# Patient Record
Sex: FEMALE | Race: WHITE | NOT HISPANIC OR LATINO | Employment: PART TIME | URBAN - METROPOLITAN AREA
[De-identification: names, ages, dates, MRNs, and addresses within clinical notes are randomized per-mention and may not be internally consistent; named-entity substitution may affect disease eponyms.]

---

## 2017-12-22 ENCOUNTER — GENERIC CONVERSION - ENCOUNTER (OUTPATIENT)
Dept: OTHER | Facility: OTHER | Age: 37
End: 2017-12-22

## 2017-12-29 ENCOUNTER — ALLSCRIPTS OFFICE VISIT (OUTPATIENT)
Dept: OTHER | Facility: OTHER | Age: 37
End: 2017-12-29

## 2017-12-30 NOTE — PROGRESS NOTES
Assessment   1  Stress incontinence, female (625 6) (N39 3)    Plan   Patient to perform Kegel's exercises according to the instructions handed out to her  Also to schedule annual visit  Discussion/Summary   Discussion Summary:    I reassured Ross Kong that on exam, she has very minimal bladder and rectal protrusion  She has also very little deflection of the urethra  Overall this is certainly not a surgical issue and nothing that Kegel's exercises if performed diligently should not be able to correct  will also return to have an annual visit  She was encouraged to call with any questions or concerns  Chief Complaint   Chief Complaint Free Text Note Form: PT THINKS SHE HAS POSSIBLE CYSTOCELE OR PROLAPSE      History of Present Illness   HPI: Ross Kong is seen today as a new patient  She had 2 normal vaginal deliveries at term of a 7 an 8 lb infant respectively  Since delivering the 2nd infant, the patient was suspicious of possible cystocele and rectocele  She denies any bowel issues  Bladder why she does have some stress incontinence especially while exercising  She denies any residual type urinary effects after she has voided  She is not sure whether not she does see a cystocele protruding  She has had no other issues and recently had a Pap smear 2 years ago that was within normal limits  Review of Systems   Focused-Female:      Constitutional: No fever, no chills, feels well, no tiredness, no recent weight gain or loss  ENT: no ear ache, no loss of hearing, no nosebleeds or nasal discharge, no sore throat or hoarseness  Cardiovascular: no complaints of slow or fast heart rate, no chest pain, no palpitations, no leg claudication or lower extremity edema  Respiratory: no complaints of shortness of breath, no wheezing, no dyspnea on exertion, no orthopnea or PND  Breasts: no complaints of breast pain, breast lump or nipple discharge        Gastrointestinal: no complaints of abdominal pain, no constipation, no nausea or diarrhea, no vomiting, no bloody stools  Genitourinary: as noted in HPI  Musculoskeletal: no complaints of arthralgia, no myalgia, no joint swelling or stiffness, no limb pain or swelling  Integumentary: no complaints of skin rash or lesion, no itching or dry skin, no skin wounds  Neurological: no complaints of headache, no confusion, no numbness or tingling, no dizziness or fainting  Active Problems   1  Asthma (493 90) (J45 909)   2  Celiac disease (579 0) (K90 0)    Past Medical History   1  Denied: History of self breast exam    Surgical History   1  History of Eye Surgery   2  History of Nasal Endoscopy Polypectomy   3  History of Oral Surgery Tooth Extraction    Family History   Family History    1  Family history of cardiac disorder (V17 49) (Z82 49)   2  Family history of chronic obstructive pulmonary disease (V17 6) (Z82 5)   3  Family history of depression (V17 0) (Z81 8)   4  Family history of essential hypertension (V17 49) (Z82 49)   5  Family history of malignant neoplasm (V16 9) (Z80 9)   6  Family history of malignant neoplasm of breast (V16 3) (Z80 3)    Social History    · Always uses seat belt   · Caffeine use (V49 89) (F15 90)   · Condom use   · Never a smoker   · Social alcohol use (Z78 9)   · Two children    Current Meds    1  Albuterol Sulfate NEBU; Therapy: (Recorded:73Loq0385) to Recorded   2  Claritin TABS; Therapy: (Recorded:51Iaf9220) to Recorded   3  Flonase 50 MCG/ACT SUSP; Therapy: (Veronica Vargas) to Recorded   4  Nystatin-Triamcinolone 413674-4 1 UNIT/GM-% External Cream;     Therapy: (Recorded:23Oil9949) to Recorded    Allergies   1  No Known Drug Allergies  2  Animal dander - Cats   3  Animal dander - Dogs   4  Dust   5  Pollen   6  Seafood   7   Shellfish    Vitals   Vital Signs    Recorded: 59EQY5327 70:61YS   Systolic 011, LUE, Sitting   Diastolic 78, LUE, Sitting   Height 5 ft 5 in   Weight 151 lb BMI Calculated 25 13   BSA Calculated 1 76   LMP [id number]     Physical Exam        Constitutional      General appearance: No acute distress, well appearing and well nourished  Genitourinary      External genitalia: Normal and no lesions appreciated  Vagina: Normal, no lesions or dryness appreciated  -- Minimal cystocele and rectocele noted with very little U-V angle deflection  Urethra: Normal        Urethral meatus: Normal        Bladder: Normal, soft, non-tender and no prolapse or masses appreciated  Cervix: Normal, no palpable masses  Uterus: Normal, non-tender, not enlarged, and no palpable masses  Adnexa/parametria: Normal, non-tender and no fullness or masses appreciated  Psychiatric      Orientation to person, place, and time: Normal        Mood and affect: Normal        Signatures    Electronically signed by :  Yaz Jean MD; Dec 29 2017 12:52PM EST                       (Author)

## 2018-01-23 VITALS
BODY MASS INDEX: 25.16 KG/M2 | SYSTOLIC BLOOD PRESSURE: 124 MMHG | HEIGHT: 65 IN | DIASTOLIC BLOOD PRESSURE: 78 MMHG | WEIGHT: 151 LBS

## 2018-01-23 NOTE — MISCELLANEOUS
Message   Date: 22 Dec 2017 10:11 AM EST, Recorded By: Massimo Macias For: Matty Carroll, Self   Phone: [phone] (Home)   Reason: Medical Complaint   pt had 2 children and she feels a bulge and feels that maybe she has a prolapse    pt will schedule appt   NP        Signatures   Electronically signed by : Oswaldo Leslie, ; Dec 22 2017 10:12AM EST                       (Author)

## 2018-07-11 ENCOUNTER — TRANSCRIBE ORDERS (OUTPATIENT)
Dept: ADMINISTRATIVE | Facility: HOSPITAL | Age: 38
End: 2018-07-11

## 2018-07-11 ENCOUNTER — HOSPITAL ENCOUNTER (OUTPATIENT)
Dept: NON INVASIVE DIAGNOSTICS | Facility: CLINIC | Age: 38
Discharge: HOME | End: 2018-07-11
Payer: COMMERCIAL

## 2018-07-11 DIAGNOSIS — R00.0 TACHYCARDIA: ICD-10-CM

## 2018-07-11 DIAGNOSIS — M79.605 LEFT LEG PAIN: ICD-10-CM

## 2018-07-11 DIAGNOSIS — R60.0 LOCALIZED EDEMA: ICD-10-CM

## 2018-07-11 DIAGNOSIS — I83.892 VARICOSE VEINS OF LEG WITH SWELLING, LEFT: ICD-10-CM

## 2018-07-11 DIAGNOSIS — M79.605 LEFT LEG PAIN: Primary | ICD-10-CM

## 2018-07-11 DIAGNOSIS — R00.0 TACHYCARDIA, UNSPECIFIED: Primary | ICD-10-CM

## 2018-07-11 DIAGNOSIS — Z82.49 FAMILY HISTORY OF ISCHEMIC HEART DISEASE: ICD-10-CM

## 2018-07-11 PROCEDURE — 93971 EXTREMITY STUDY: CPT | Performed by: INTERNAL MEDICINE

## 2018-07-11 PROCEDURE — 93971 EXTREMITY STUDY: CPT

## 2018-07-27 ENCOUNTER — HOSPITAL ENCOUNTER (OUTPATIENT)
Dept: NON INVASIVE DIAGNOSTICS | Facility: CLINIC | Age: 38
Discharge: HOME | End: 2018-07-27
Payer: COMMERCIAL

## 2018-07-27 DIAGNOSIS — Z82.49 FAMILY HISTORY OF ISCHEMIC HEART DISEASE: ICD-10-CM

## 2018-07-27 DIAGNOSIS — R00.0 TACHYCARDIA, UNSPECIFIED: ICD-10-CM

## 2018-07-27 PROCEDURE — 93306 TTE W/DOPPLER COMPLETE: CPT | Performed by: INTERNAL MEDICINE

## 2018-07-27 PROCEDURE — 93225 XTRNL ECG REC<48 HRS REC: CPT

## 2018-07-27 PROCEDURE — 93306 TTE W/DOPPLER COMPLETE: CPT

## 2018-07-27 PROCEDURE — 93226 XTRNL ECG REC<48 HR SCAN A/R: CPT

## 2018-08-10 PROCEDURE — 93227 XTRNL ECG REC<48 HR R&I: CPT | Performed by: INTERNAL MEDICINE

## 2021-05-03 ENCOUNTER — TRANSCRIBE ORDERS (OUTPATIENT)
Dept: ADMINISTRATIVE | Facility: HOSPITAL | Age: 41
End: 2021-05-03

## 2021-05-03 DIAGNOSIS — G62.9 POLYNEUROPATHY, UNSPECIFIED: ICD-10-CM

## 2021-05-03 DIAGNOSIS — M46.92 CERVICAL SPONDYLITIS (HCC): Primary | ICD-10-CM

## 2022-10-11 ENCOUNTER — TELEPHONE (OUTPATIENT)
Dept: PSYCHIATRY | Facility: CLINIC | Age: 42
End: 2022-10-11

## 2023-02-09 NOTE — TELEPHONE ENCOUNTER
called pt in regards to child waitlist for PF  LVM for pt to contact intake at [facility]  DISCHARGE

## 2023-04-26 ENCOUNTER — TELEPHONE (OUTPATIENT)
Dept: PSYCHIATRY | Facility: CLINIC | Age: 43
End: 2023-04-26

## 2023-11-14 ENCOUNTER — OFFICE VISIT (OUTPATIENT)
Dept: OBGYN CLINIC | Facility: CLINIC | Age: 43
End: 2023-11-14
Payer: COMMERCIAL

## 2023-11-14 VITALS
DIASTOLIC BLOOD PRESSURE: 60 MMHG | HEIGHT: 64 IN | BODY MASS INDEX: 29.06 KG/M2 | WEIGHT: 170.2 LBS | SYSTOLIC BLOOD PRESSURE: 108 MMHG

## 2023-11-14 DIAGNOSIS — Z12.31 ENCOUNTER FOR SCREENING MAMMOGRAM FOR MALIGNANT NEOPLASM OF BREAST: ICD-10-CM

## 2023-11-14 DIAGNOSIS — Z01.419 ENCOUNTER FOR ANNUAL ROUTINE GYNECOLOGICAL EXAMINATION: Primary | ICD-10-CM

## 2023-11-14 DIAGNOSIS — N92.0 MENORRHAGIA WITH REGULAR CYCLE: ICD-10-CM

## 2023-11-14 DIAGNOSIS — Z80.3 FAMILY HISTORY OF BREAST CANCER: ICD-10-CM

## 2023-11-14 DIAGNOSIS — Z12.4 CERVICAL CANCER SCREENING: ICD-10-CM

## 2023-11-14 PROCEDURE — 99386 PREV VISIT NEW AGE 40-64: CPT | Performed by: OBSTETRICS & GYNECOLOGY

## 2023-11-14 RX ORDER — EPINEPHRINE 0.3 MG/.3ML
INJECTION SUBCUTANEOUS
COMMUNITY
Start: 2023-08-16 | End: 2023-11-14

## 2023-11-14 RX ORDER — ALBUTEROL SULFATE 90 UG/1
2 AEROSOL, METERED RESPIRATORY (INHALATION)
COMMUNITY
Start: 2023-09-08

## 2023-11-14 RX ORDER — CETIRIZINE HYDROCHLORIDE 10 MG/1
10 TABLET, CHEWABLE ORAL DAILY
COMMUNITY

## 2023-11-14 RX ORDER — CLOBETASOL PROPIONATE 0.5 MG/G
OINTMENT TOPICAL AS NEEDED
COMMUNITY
Start: 2023-10-30

## 2023-11-14 RX ORDER — MULTIVITAMIN
1 TABLET ORAL DAILY
COMMUNITY

## 2023-11-14 NOTE — PATIENT INSTRUCTIONS
- Maintain healthy weight with BMI ideally between 18-25.    - Eat a healthy diet, including multiple servings of vegetables and fruits, as well as lean protein sources. - Get at least 150 minutes of moderate aerobic activity or 75 minutes of vigorous aerobic activity a week, or a combination of moderate and vigorous activity. Greater amounts of exercise will provide an even greater health benefit. - Ensure diet provides 1200mg of Calcium daily (divided) and 800IU of vitamin D, or take supplements to meet this. - Safe sex practices recommended. - Resources - information on birth control and sexually transmitted infections - www.bedsider. org            - information on sexually transmitted infections - www.cdc.gov/std/     Lysteda for heavy periods - prescription  Ibuprofen 600mg every 6 hours - over the counter

## 2023-11-14 NOTE — LETTER
November 19, 2023     Lennox Paradise, DO L[facility] Smart    Patient: Staci Asif   YOB: 1980   Date of Visit: 11/14/2023       Dear Dr. Shine Bring: Thank you for referring Yamilet Turner to me for evaluation. Below are my notes for this consultation. If you have questions, please do not hesitate to call me. I look forward to following your patient along with you. Sincerely,        Karin Herrmann MD        CC: No Recipients    Karin Herrmann MD  11/19/2023  5:03 PM  Sign when Signing Visit  Annual Wellness Visit  [address], [address], [facility] [address] [facility],[address]    ASSESSMENT/PLAN: Staci Asif is a 41 y.o. No obstetric history on file. who presents for annual gynecologic exam.    Encounter for routine gynecologic examination  - Routine well woman exam completed today. - Cervical Cancer Screening: Current ASCCP Guidelines reviewed. Last Pap: 2029 per pt. Next Pap Due: today, routine.  - STI screening offered including HIV testing: offered, pt declined  - Contraceptive counseling discussed. Current contraception: condoms,   - Breast Cancer Screening: Last Mammogram normal per pt - due now. - The following were reviewed in today's visit: mammography screening ordered, family planning choices, exercise, and healthy diet    Additional problems addressed during this visit:  1. Encounter for annual routine gynecological examination    2. Encounter for screening mammogram for malignant neoplasm of breast  -     Mammo screening bilateral w 3d & cad; Future    3. Family history of breast cancer  -     Mammo screening bilateral w 3d & cad; Future    4. Cervical cancer screening  -     Thinprep Tis Pap and HPV mRNA E6/E7 Reflex HPV 16,18/45    5. Menorrhagia with regular cycle  Assessment & Plan:  Regular but short cycle. 2023 cbc and tsh normal with PCP. Offered u/s and discussed Tranexamic Acid trial, pt declined both.           Next visit: 1 year WEllness      CC:  Annual Gynecologic Examination    HPI: Hugh Velasco is a 41 y.o. No obstetric history on file. who presents for annual gynecologic examination. Brent Shelley presents as new patient today for [employer]. Periods used to be 4-5day, moderate flow. Now in last year first day bleeding through pads and tampons. Next 3 days moderate to light. Periods about monthly - about 26 days. Hgb 11g/dl and TSH normal with PCP recently. Condoms for contraception. Was on OCPs before - stopped a couple years ago. No new partners. No issues. Last pap 2019 normal.  No abnl or positive HPV. Gardasil not done. Fibrocystic breast and often f/u mammograms. Screening due now. Celiac disease and seeing rheum for possible rheum arthritis. Gyn History  Patient's last menstrual period was 10/24/2023 (approximate). Last Pap: 2019 normal per pt    She  reports being sexually active and has had partner(s) who are male. She reports using the following method of birth control/protection: Condom. OB History  No obstetric history on file. Past Medical History:  No date: Asthma  No date: Celiac disease  No date: Eczema      Comment:  topical treament  2019: History of Papanicolaou smear of cervix      Comment:  negative per patient  07/2023: History of screening mammography      Comment:  Fibrocystic breasts with calcifications. Mammo every 6                months.   No date: Seasonal allergies     Past Surgical History:  2000: WISDOM TOOTH EXTRACTION     Family History   Problem Relation Age of Onset   • Skin cancer Father         father with genetic testing and no mutations known   • Colon polyps Father    • Pancreatic cancer Paternal Grandmother 61   • Breast cancer Paternal Aunt 46   • Lymphoma Paternal Uncle    • Colon cancer Neg Hx         Social History     Tobacco Use   • Smoking status: Never   • Smokeless tobacco: Never   Vaping Use   • Vaping Use: Never used   Substance Use Topics   • Alcohol use: Yes     Comment: social   • Drug use: Never          Current Outpatient Medications:   •  albuterol (PROVENTIL HFA,VENTOLIN HFA) 90 mcg/act inhaler, Inhale 2 puffs every 4 to 6 hours as needed, Disp: , Rfl:   •  cetirizine (ZyrTEC) 10 MG chewable tablet, Chew 10 mg daily, Disp: , Rfl:   •  clobetasol (TEMOVATE) 0.05 % ointment, if needed, Disp: , Rfl:   •  Multiple Vitamin (multivitamin) tablet, Take 1 tablet by mouth daily, Disp: , Rfl:     She is allergic to fish allergy - food allergy, cat hair extract, dust mite extract, pollen extract, and shellfish allergy - food allergy. .    ROS negative except as noted in HPI    Objective:  /60   Ht 5' 4.25" (1.632 m)   Wt 77.2 kg (170 lb 3.2 oz)   LMP 10/24/2023 (Approximate)   Breastfeeding No   BMI 28.99 kg/m²      Physical Exam  Constitutional:       Appearance: Normal appearance. Chest:   Breasts:     Right: Normal. No mass or tenderness. Left: Normal. No mass or tenderness. Abdominal:      Palpations: Abdomen is soft. Tenderness: There is no abdominal tenderness. Genitourinary:     General: Normal vulva. Vagina: No bleeding or lesions. Cervix: Normal.      Uterus: Normal. Not tender. Adnexa:         Right: No mass or tenderness. Left: No mass or tenderness. Rectum: No external hemorrhoid. Musculoskeletal:         General: Normal range of motion. Lymphadenopathy:      Upper Body:      Right upper body: No axillary adenopathy. Left upper body: No axillary adenopathy. Neurological:      Mental Status: She is alert and oriented to person, place, and time.    Psychiatric:         Mood and Affect: Mood normal.         Behavior: Behavior normal.

## 2023-11-14 NOTE — PROGRESS NOTES
Annual Wellness Visit  [address]  [address] [facility], [address], [place], [place] [address]    ASSESSMENT/PLAN: Arlette Snyder is a 41 y.o. No obstetric history on file. who presents for annual gynecologic exam.    Encounter for routine gynecologic examination  - Routine well woman exam completed today. - Cervical Cancer Screening: Current ASCCP Guidelines reviewed. Last Pap: 2029 per pt. Next Pap Due: today, routine.  - STI screening offered including HIV testing: offered, pt declined  - Contraceptive counseling discussed. Current contraception: condoms,   - Breast Cancer Screening: Last Mammogram normal per pt - due now. - The following were reviewed in today's visit: mammography screening ordered, family planning choices, exercise, and healthy diet    Additional problems addressed during this visit:  1. Encounter for annual routine gynecological examination    2. Encounter for screening mammogram for malignant neoplasm of breast  -     Mammo screening bilateral w 3d & cad; Future    3. Family history of breast cancer  -     Mammo screening bilateral w 3d & cad; Future    4. Cervical cancer screening  -     Thinprep Tis Pap and HPV mRNA E6/E7 Reflex HPV 16,18/45    5. Menorrhagia with regular cycle  Assessment & Plan:  Regular but short cycle. 2023 cbc and tsh normal with PCP. Offered u/s and discussed Tranexamic Acid trial, pt declined both. Next visit: 1 year WEllness      CC:  Annual Gynecologic Examination    HPI: Arlette Snyder is a 41 y.o. No obstetric history on file. who presents for annual gynecologic examination. Evelin Kelly presents as new patient today for [employer]. Periods used to be 4-5day, moderate flow. Now in last year first day bleeding through pads and tampons. Next 3 days moderate to light. Periods about monthly - about 26 days. Hgb 11g/dl and TSH normal with PCP recently. Condoms for contraception. Was on OCPs before - stopped a couple years ago.   No new partners. No issues. Last pap 2019 normal.  No abnl or positive HPV. Gardasil not done. Fibrocystic breast and often f/u mammograms. Screening due now. Celiac disease and seeing rheum for possible rheum arthritis. Gyn History  Patient's last menstrual period was 10/24/2023 (approximate). Last Pap: 2019 normal per pt    She  reports being sexually active and has had partner(s) who are male. She reports using the following method of birth control/protection: Condom. OB History  No obstetric history on file. Past Medical History:  No date: Asthma  No date: Celiac disease  No date: Eczema      Comment:  topical treament  2019: History of Papanicolaou smear of cervix      Comment:  negative per patient  07/2023: History of screening mammography      Comment:  Fibrocystic breasts with calcifications. Mammo every 6                months.   No date: Seasonal allergies     Past Surgical History:  2000: WISDOM TOOTH EXTRACTION     Family History   Problem Relation Age of Onset    Skin cancer Father         father with genetic testing and no mutations known    Colon polyps Father     Pancreatic cancer Paternal Grandmother 61    Breast cancer Paternal Aunt 48    Lymphoma Paternal Uncle     Colon cancer Neg Hx         Social History     Tobacco Use    Smoking status: Never    Smokeless tobacco: Never   Vaping Use    Vaping Use: Never used   Substance Use Topics    Alcohol use: Yes     Comment: social    Drug use: Never          Current Outpatient Medications:     albuterol (PROVENTIL HFA,VENTOLIN HFA) 90 mcg/act inhaler, Inhale 2 puffs every 4 to 6 hours as needed, Disp: , Rfl:     cetirizine (ZyrTEC) 10 MG chewable tablet, Chew 10 mg daily, Disp: , Rfl:     clobetasol (TEMOVATE) 0.05 % ointment, if needed, Disp: , Rfl:     Multiple Vitamin (multivitamin) tablet, Take 1 tablet by mouth daily, Disp: , Rfl:     She is allergic to fish allergy - food allergy, cat hair extract, dust mite extract, pollen extract, and shellfish allergy - food allergy. .    ROS negative except as noted in HPI    Objective:  /60   Ht 5' 4.25" (1.632 m)   Wt 77.2 kg (170 lb 3.2 oz)   LMP 10/24/2023 (Approximate)   Breastfeeding No   BMI 28.99 kg/m²      Physical Exam  Constitutional:       Appearance: Normal appearance. Chest:   Breasts:     Right: Normal. No mass or tenderness. Left: Normal. No mass or tenderness. Abdominal:      Palpations: Abdomen is soft. Tenderness: There is no abdominal tenderness. Genitourinary:     General: Normal vulva. Vagina: No bleeding or lesions. Cervix: Normal.      Uterus: Normal. Not tender. Adnexa:         Right: No mass or tenderness. Left: No mass or tenderness. Rectum: No external hemorrhoid. Musculoskeletal:         General: Normal range of motion. Lymphadenopathy:      Upper Body:      Right upper body: No axillary adenopathy. Left upper body: No axillary adenopathy. Neurological:      Mental Status: She is alert and oriented to person, place, and time.    Psychiatric:         Mood and Affect: Mood normal.         Behavior: Behavior normal.

## 2023-11-16 LAB
CLINICAL INFO: NORMAL
CYTO CVX: NORMAL
CYTOLOGY CMNT CVX/VAG CYTO-IMP: NORMAL
DATE PREVIOUS BX: NORMAL
HPV E6+E7 MRNA CVX QL NAA+PROBE: NOT DETECTED
LMP START DATE: NORMAL
SL AMB PREV. PAP:: NORMAL
SPECIMEN SOURCE CVX/VAG CYTO: NORMAL

## 2023-11-19 PROBLEM — N92.0 HEAVY PERIODS: Status: ACTIVE | Noted: 2023-11-19

## 2023-11-19 NOTE — ASSESSMENT & PLAN NOTE
Regular but short cycle. 2023 cbc and tsh normal with PCP. Offered u/s and discussed Tranexamic Acid trial, pt declined both.

## 2025-05-14 ENCOUNTER — NURSE TRIAGE (OUTPATIENT)
Age: 45
End: 2025-05-14

## 2025-05-14 NOTE — TELEPHONE ENCOUNTER
"FOLLOW UP: office visit scheduled 6/9 per patient request with availability     REASON FOR CONVERSATION: Vaginal Problem    SYMPTOMS: nodule noted on vaginal wall during colonoscopy    OTHER: Patient called in reporting that she was notified that there was a nodule palpated on vaginal wall during exam for colonoscopy. Patient denies any symptoms or concerns, was advised to follow up with GYN for evaluation.     DISPOSITION: See Within 2 Weeks in Office      Reason for Disposition   All other vaginal symptoms  (Exceptions: Feels like prior yeast infection, minor abrasion, mild rash < 24 hour duration, mild itching, vaginal dryness during sex.)    Answer Assessment - Initial Assessment Questions  1. SYMPTOM: \"What's the main symptom you're concerned about?\" (e.g., pain, itching, dryness)      Vaginal nodule noted during routine colonoscopy   2. LOCATION: \"Where is the  nodule located?\" (e.g., inside/outside, left/right)      Inside vagina  3. ONSET: \"When did the symptom  start?\"      Noted during colonoscopy 5/5   4. PAIN: \"Is there any pain?\" If Yes, ask: \"How bad is it?\" (Scale: 1-10; mild, moderate, severe)      denies  5. ITCHING: \"Is there any itching?\" If Yes, ask: \"How bad is it?\" (Scale: 1-10; mild, moderate, severe)      Denies   6. CAUSE: \"What do you think is causing the discharge?\" \"Have you had the same problem before?\" \"What happened then?\"      unsure  7. OTHER SYMPTOMS: \"Do you have any other symptoms?\" (e.g., fever, itching, vaginal bleeding, pain with urination, injury to genital area, vaginal foreign body)      denies  8. PREGNANCY: \"Is there any chance you are pregnant?\" \"When was your last menstrual period?\"      Denies; 5/6/25    Protocols used: Vaginal Symptoms-Adult-[place]    "

## 2025-06-09 ENCOUNTER — OFFICE VISIT (OUTPATIENT)
Dept: OBGYN CLINIC | Facility: CLINIC | Age: 45
End: 2025-06-09
Payer: COMMERCIAL

## 2025-06-09 VITALS
SYSTOLIC BLOOD PRESSURE: 104 MMHG | BODY MASS INDEX: 30.22 KG/M2 | WEIGHT: 177 LBS | HEIGHT: 64 IN | DIASTOLIC BLOOD PRESSURE: 70 MMHG

## 2025-06-09 DIAGNOSIS — N83.209 CYST OF OVARY, UNSPECIFIED LATERALITY: Primary | ICD-10-CM

## 2025-06-09 DIAGNOSIS — Z12.31 ENCOUNTER FOR SCREENING MAMMOGRAM FOR MALIGNANT NEOPLASM OF BREAST: ICD-10-CM

## 2025-06-09 PROCEDURE — 99213 OFFICE O/P EST LOW 20 MIN: CPT | Performed by: OBSTETRICS & GYNECOLOGY

## 2025-06-09 RX ORDER — OMEPRAZOLE 20 MG/1
20 CAPSULE, DELAYED RELEASE ORAL DAILY
COMMUNITY

## 2025-06-09 NOTE — PROGRESS NOTES
"[facility] [address], [address], [place] [address]  Assessment & Plan  Encounter for screening mammogram for malignant neoplasm of breast    Orders:    Mammo screening bilateral w 3d and cad; Future    Cyst of ovary, unspecified laterality  No nodules palpated on exam. Will check pelvic u/s to rule out ovarian cyst. RTO for annual  Orders:    US pelvis complete w transvaginal; Future    Subjective:   Verona Murry is a 44 y.o.  .  CC:   Chief Complaint   Patient presents with    Gynecology Problem     GI doctor felt nodule        HPI: 45 yo female presents with possible vaginal nodule. She was doing a colonoscopy and GI doc felt nodule on exam.     ROS: Negative except as noted in HPI    Patient's last menstrual period was 2025.       She  reports being sexually active and has had partner(s) who are male. She reports using the following method of birth control/protection: Condom.       The following portions of the patient's history were reviewed and updated as appropriate:   Past Medical History[1]  Past Surgical History[2]  Family History[3]  Social History[4]  Outpatient Medications Marked as Taking for the 25 encounter (Office Visit) with Georgia Cunningham V, DO   Medication    albuterol (PROVENTIL HFA,VENTOLIN HFA) 90 mcg/act inhaler    cetirizine (ZyrTEC) 10 MG chewable tablet    clobetasol (TEMOVATE) 0.05 % ointment    Multiple Vitamin (multivitamin) tablet    omeprazole (PriLOSEC) 20 mg delayed release capsule     Allergies[5]        Objective:  /70 (BP Location: Right arm, Patient Position: Sitting, Cuff Size: Standard)   Ht 5' 4.25\" (1.632 m)   Wt 80.3 kg (177 lb)   LMP 2025   BMI 30.15 kg/m²          General Appearance: alert and oriented, in no acute distress.   Abdomen: Soft, non-tender, non-distended, no masses, no rebound or guarding.  Pelvic:       External genitalia: Normal appearance, no abnormal pigmentation, no lesions or masses. Normal " [place]th[place]'s and [place]'s.      Urinary system: Urethral meatus normal, bladder non-tender.      Vaginal: normal mucosa without prolapse or lesions. Normal-appearing physiologic discharge.      Cervix: Normal-appearing, well-epithelialized, no gross lesions or masses. No cervical motion tenderness.      Adnexa: No adnexal masses or tenderness noted.      Uterus: Normal-sized, regular contour, midline, mobile, no uterine tenderness.      Rectovaginal: Pt declined today   Extremities: Normal range of motion.   Skin: normal, no rash or abnormalities  Neurologic: alert, oriented x3  Psychiatric: Appropriate affect, mood stable, cooperative with exam.        Georgia Cunningham,   6/9/2025 3:46 PM        [1]   Past Medical History:  Diagnosis Date    Asthma     Celiac disease     Eczema     topical treament    History of Papanicolaou smear of cervix 2019    negative per patient    History of screening mammography 07/2023    Fibrocystic breasts with calcifications. Mammo every 6 months.    Seasonal allergies    [2]   Past Surgical History:  Procedure Laterality Date    WISDOM TOOTH EXTRACTION  2000   [3]   Family History  Problem Relation Name Age of Onset    Skin cancer Father          father with genetic testing and no mutations known    Colon polyps Father      Pancreatic cancer Paternal Grandmother  60    Breast cancer Paternal Aunt  50    Lymphoma Paternal Uncle      Colon cancer Neg Hx     [4]   Social History  Socioeconomic History    Marital status: /Civil Union   Tobacco Use    Smoking status: Never    Smokeless tobacco: Never   Vaping Use    Vaping status: Never Used   Substance and Sexual Activity    Alcohol use: Yes     Comment: social    Drug use: Never    Sexual activity: Yes     Partners: Male     Birth control/protection: Condom   [5]   Allergies  Allergen Reactions    Fish Allergy - Food Allergy Anaphylaxis    Cat Dander Other (See Comments)    Dust Mite Extract Other (See Comments)    Pollen Extract  Other (See Comments)    Shellfish Allergy - Food Allergy Hives

## 2025-07-14 ENCOUNTER — ANNUAL EXAM (OUTPATIENT)
Dept: OBGYN CLINIC | Facility: CLINIC | Age: 45
End: 2025-07-14
Payer: COMMERCIAL

## 2025-07-14 VITALS
BODY MASS INDEX: 30.73 KG/M2 | WEIGHT: 180 LBS | HEIGHT: 64 IN | DIASTOLIC BLOOD PRESSURE: 68 MMHG | SYSTOLIC BLOOD PRESSURE: 110 MMHG

## 2025-07-14 DIAGNOSIS — Z12.31 ENCOUNTER FOR SCREENING MAMMOGRAM FOR MALIGNANT NEOPLASM OF BREAST: ICD-10-CM

## 2025-07-14 DIAGNOSIS — Z01.419 WELL WOMAN EXAM: Primary | ICD-10-CM

## 2025-07-14 PROCEDURE — S0612 ANNUAL GYNECOLOGICAL EXAMINA: HCPCS | Performed by: OBSTETRICS & GYNECOLOGY

## 2025-07-14 NOTE — PROGRESS NOTES
[facility] [address], [address], [place] [address]    ASSESSMENT/PLAN: Verona Murry is a 44 y.o.  who presents for annual gynecologic exam.    Encounter for routine gynecologic examination  - Routine well woman exam completed today.  - Cervical Cancer Screening: Current ASCCP Guidelines reviewed. Last Pap: 2023 . History of abnormal: denies.  - STI screening offered including HIV testing: offered, pt declined  - Contraceptive counseling discussed.  Current contraception: condoms:   - Breast Cancer Screening: Last Mammogram Not on file,   - Colorectal cancer screening recommended- pt up to date  - The following were reviewed in today's visit: breast self exam, adequate intake of calcium and vitamin D, exercise, and healthy diet    Additional problems addressed during this visit:  1. Well woman exam  2. Encounter for screening mammogram for malignant neoplasm of breast  -     Mammo screening bilateral w 3d and cad; Future      CC:  Annual Gynecologic Examination    HPI: Verona Murry is a 44 y.o.  who presents for annual gynecologic examination. She reports a monthly menses, with the first day being heavy. NO vasomotor symptoms. She reports no gyn concerns. She did have her ultrasound at [facility], however no result present. Will get records.    ROS: Negative except as noted in HPI    Patient's last menstrual period was 2025 (approximate).       She  reports being sexually active and has had partner(s) who are male. She reports using the following method of birth control/protection: Condom.       The following portions of the patient's history were reviewed and updated as appropriate:   Past Medical History[1]  Past Surgical History[2]  Family History[3]  Social History[4]  Outpatient Medications Marked as Taking for the 25 encounter (Annual Exam) with Georgia Cunningham V, DO   Medication    albuterol (PROVENTIL HFA,VENTOLIN HFA) 90 mcg/act inhaler     "cetirizine (ZyrTEC) 10 MG chewable tablet    clobetasol (TEMOVATE) 0.05 % ointment    Multiple Vitamin (multivitamin) tablet    omeprazole (PriLOSEC) 20 mg delayed release capsule     Allergies[5]        Objective:  /68 (BP Location: Left arm, Patient Position: Sitting, Cuff Size: Standard)   Ht 5' 4.25\" (1.632 m)   Wt 81.6 kg (180 lb)   LMP 06/29/2025 (Approximate)   BMI 30.66 kg/m²        General Appearance: alert and oriented, in no acute distress.   Respiratory: Unlabored breathing.  Abdomen: Soft, non-tender, non-distended, no masses, no rebound or guarding.  Breast Exam: No dimpling, nipple retraction or discharge. No lumps or masses. No axillary or supraclavicular nodes.   Pelvic:       External genitalia: Normal appearance, no abnormal pigmentation, no lesions or masses. Normal Bartholin's and [place] T[place]'s.      Urinary system: Urethral meatus normal,       Bladder non-tender.      Vaginal: normal mucosa without prolapse or lesions. Normal-appearing physiologic discharge.      Cervix: Normal-appearing, well-epithelialized, no gross lesions or masses. No cervical motion tenderness.      Adnexa: No adnexal masses or tenderness noted.      Uterus: Normal-sized, regular contour, midline, mobile, no uterine tenderness.  Extremities: Normal range of motion. Warm, well-perfused, non-tender.   Skin: normal, no rash or abnormalities  Neurologic: alert, oriented x3  Psychiatric: Appropriate affect, mood stable, cooperative with exam.        Georgia Cunningham DO  7/14/2025 4:58 PM          [1]   Past Medical History:  Diagnosis Date    Asthma     Celiac disease     Eczema     topical treament    History of Papanicolaou smear of cervix 2019    negative per patient    History of screening mammography 07/2023    Fibrocystic breasts with calcifications. Mammo every 6 months.    Seasonal allergies    [2]   Past Surgical History:  Procedure Laterality Date    WISDOM TOOTH EXTRACTION  2000   [3]   Family History  Problem " Relation Name Age of Onset    Skin cancer Father          father with genetic testing and no mutations known    Colon polyps Father      Pancreatic cancer Paternal Grandmother  60    Breast cancer Paternal Aunt  50    Lymphoma Paternal Uncle      Colon cancer Neg Hx     [4]   Social History  Socioeconomic History    Marital status: /Civil Union   Tobacco Use    Smoking status: Never    Smokeless tobacco: Never   Vaping Use    Vaping status: Never Used   Substance and Sexual Activity    Alcohol use: Yes     Comment: social    Drug use: Never    Sexual activity: Yes     Partners: Male     Birth control/protection: Condom   [5]   Allergies  Allergen Reactions    Fish Allergy - Food Allergy Anaphylaxis    Cat Dander Other (See Comments)    Dust Mite Extract Other (See Comments)    Pollen Extract Other (See Comments)    Shellfish Allergy - Food Allergy Hives